# Patient Record
Sex: FEMALE | Employment: UNEMPLOYED
[De-identification: names, ages, dates, MRNs, and addresses within clinical notes are randomized per-mention and may not be internally consistent; named-entity substitution may affect disease eponyms.]

---

## 2017-08-05 ENCOUNTER — HEALTH MAINTENANCE LETTER (OUTPATIENT)
Age: 37
End: 2017-08-05

## 2018-07-31 ENCOUNTER — AMBULATORY - HEALTHEAST (OUTPATIENT)
Dept: INTERNAL MEDICINE | Facility: CLINIC | Age: 38
End: 2018-07-31

## 2019-05-22 ENCOUNTER — HOSPITAL ENCOUNTER (EMERGENCY)
Facility: CLINIC | Age: 39
Discharge: HOME | End: 2019-05-22
Attending: PSYCHIATRY & NEUROLOGY | Admitting: PSYCHIATRY & NEUROLOGY
Payer: COMMERCIAL

## 2019-05-22 VITALS
OXYGEN SATURATION: 99 % | DIASTOLIC BLOOD PRESSURE: 47 MMHG | SYSTOLIC BLOOD PRESSURE: 109 MMHG | TEMPERATURE: 98.5 F | HEART RATE: 69 BPM | RESPIRATION RATE: 16 BRPM

## 2019-05-22 DIAGNOSIS — G89.4 CHRONIC PAIN SYNDROME: ICD-10-CM

## 2019-05-22 DIAGNOSIS — F43.0 ACUTE REACTION TO STRESS: ICD-10-CM

## 2019-05-22 PROCEDURE — 99285 EMERGENCY DEPT VISIT HI MDM: CPT | Performed by: PSYCHIATRY & NEUROLOGY

## 2019-05-22 PROCEDURE — 99284 EMERGENCY DEPT VISIT MOD MDM: CPT | Mod: Z6 | Performed by: PSYCHIATRY & NEUROLOGY

## 2019-05-22 RX ORDER — BUPROPION HYDROCHLORIDE 300 MG/1
TABLET ORAL EVERY MORNING
COMMUNITY

## 2019-05-22 ASSESSMENT — ENCOUNTER SYMPTOMS
HYPERACTIVE: 0
ARTHRALGIAS: 1
ACTIVITY CHANGE: 1
MYALGIAS: 1
RESPIRATORY NEGATIVE: 1
GASTROINTESTINAL NEGATIVE: 1
DECREASED CONCENTRATION: 1
HEMATOLOGIC/LYMPHATIC NEGATIVE: 1
CARDIOVASCULAR NEGATIVE: 1
HALLUCINATIONS: 0
NERVOUS/ANXIOUS: 1
EYES NEGATIVE: 1

## 2019-05-22 NOTE — ED PROVIDER NOTES
I have performed an in person assessment of the patient. Based on this assessment the patient no longer requires a one on one attendant at this point in time.    Regla Hodges MD  1:48 PM  May 22, 2019           Regla Hodges MD  05/22/19 [id number]

## 2019-05-22 NOTE — DISCHARGE INSTRUCTIONS
Please follow the recommended treatment program with your primary care provider  Follow-up established care and services

## 2019-05-22 NOTE — ED AVS SNAPSHOT
[facility], [place], Emergency Department  [address]  [facility] [address]  Phone:  [phone]  Fax:  [phone]                                    Regla Lima   MRN: [id number]    Department:  [facility], Emergency Department   Date of Visit:  5/22/2019           After Visit Summary Signature Page    I have received my discharge instructions, and my questions have been answered. I have discussed any challenges I see with this plan with the nurse or doctor.    ..........................................................................................................................................  Patient/Patient Representative Signature      ..........................................................................................................................................  Patient Representative Print Name and Relationship to Patient    ..................................................               ................................................  Date                                   Time    ..........................................................................................................................................  Reviewed by Signature/Title    ...................................................              ..............................................  Date                                               Time          22EPIC Rev 08/18

## 2019-05-22 NOTE — ED NOTES
Bed: HW01  Expected date: 5/22/19  Expected time: 12:42 PM  Means of arrival: Ambulance  Comments:  [id number], [id number], crisis eval, yellow 5.

## 2019-05-22 NOTE — ED PROVIDER NOTES
History     Chief Complaint   Patient presents with     Suicidal     Pt was at clinic and made statements that could be inferred as self-harming. She has fibromyalgia and is in significant pain. She states that she will take any meds necessary for pain relief     The history is provided by the patient and medical records.     Regla Lima is a 38 year old female who has history of opiate dependence and is currently on methadone maintenance. Patient gets care through [facility]. She admits to struggling with heal pain lately. She felt overwhelmed given her multitude of medical conditions including fibromyalgia, chronic pain and being homeless. She has been using her narcotics inappropriately and needed hospitalized a week ago. [place] [facility] [place] and was given one dose of tylenol for her heel pain. She then had gone to the [facility] to see her doctor. She reports her doctor no longer will prescribe klonopin and ambien due to her recent ingestion. She got upset. She disclosed thoughts of going out to use. She alluded to suicidal thoughts. She felt more emotionally dysregulated when police arrived. She has been feeling better since arrival. She now denies thoughts of harming herself. She plans on agreeing to her doctor's recommended pain plan of taking Lyrica (which she finds helpful) but will be on a weekly basis. She will be getting a walker to help with ambulation. Patient feels safe going home. She will call her spouse to pick her up.    PERSONAL MEDICAL HISTORY  Past Medical History:   Diagnosis Date     Arthritis      Bone spur of right foot      Fibromyalgia      PAST SURGICAL HISTORY  Past Surgical History:   Procedure Laterality Date     CHOLECYSTECTOMY       ENT SURGERY      tonsillectomy     GASTRIC BYPASS       ORTHOPEDIC SURGERY      L knee     FAMILY HISTORY  Family History   Problem Relation Age of Onset     Arthritis Mother         Dad, Sister       Myocardial Infarction Mother 55     Hypertension Mother         Dad, sister, brothers      Cerebrovascular Disease Mother 40     Diabetes Mother         Father, brothers, sisters     Myocardial Infarction Father 50     Cerebrovascular Disease Father 70     Autoimmune Disease Brother         Wegeners      Cerebrovascular Disease Sister 35     SOCIAL HISTORY  Social History     Tobacco Use     Smoking status: Current Some Day Smoker     Types: Cigarettes     Smokeless tobacco: Current User   Substance Use Topics     Alcohol use: Yes     Comment: x2 in the last week     MEDICATIONS  No current facility-administered medications for this encounter.      Current Outpatient Medications   Medication     buPROPion (WELLBUTRIN XL) 300 MG 24 hr tablet     METHADONE HCL PO     pregabalin (LYRICA) 50 MG capsule     QUEtiapine (SEROQUEL) 50 MG tablet     venlafaxine (EFFEXOR) 37.5 MG tablet     ALLERGIES  Allergies   Allergen Reactions     Nsaids GI Disturbance     Tylenol [Acetaminophen] Other (See Comments)     Has liver enzymes elavate     Compazine [Prochlorperazine] Anxiety         I have reviewed the Medications, Allergies, Past Medical and Surgical History, and Social History in the Epic system.    Review of Systems   Constitutional: Positive for activity change.   HENT: Negative.    Eyes: Negative.    Respiratory: Negative.    Cardiovascular: Negative.    Gastrointestinal: Negative.    Genitourinary: Negative.    Musculoskeletal: Positive for arthralgias, gait problem and myalgias.   Skin: Negative.    Hematological: Negative.    Psychiatric/Behavioral: Positive for decreased concentration and suicidal ideas. Negative for hallucinations. The patient is nervous/anxious. The patient is not hyperactive.    All other systems reviewed and are negative.      Physical Exam   BP: 102/55  Pulse: 73  Temp: 96.9  F (36.1  C)  Resp: 16  SpO2: 99 %      Physical Exam   Constitutional: She appears well-developed and well-nourished.    HENT:   Head: Normocephalic.   Eyes: Pupils are equal, round, and reactive to light.   Neck: Normal range of motion.   Cardiovascular: Normal rate.   Pulmonary/Chest: Effort normal.   Abdominal: Soft.   Neurological: She is alert.   Skin: Skin is warm.   Psychiatric: She has a normal mood and affect. Her speech is normal and behavior is normal. Judgment and thought content normal. She is not agitated, not aggressive, not hyperactive, not actively hallucinating and not combative. Thought content is not paranoid and not delusional. Cognition and memory are normal. She expresses no homicidal and no suicidal ideation.   Nursing note and vitals reviewed.      ED Course        Procedures               Labs Ordered and Resulted from Time of ED Arrival Up to the Time of Departure from the ED - No data to display         Assessments & Plan (with Medical Decision Making)   Patient with chronic pain who got upset upon learning that she will not get klonopin and ambien. She now is able to process her treatment plan and agrees to it. She can be discharged. She is to follow-up established care and services.    I have reviewed the nursing notes.    I have reviewed the findings, diagnosis, plan and need for follow up with the patient.       Medication List      There are no discharge medications for this visit.         Final diagnoses:   Acute reaction to stress   Chronic pain syndrome       5/22/2019   [facility], [place], EMERGENCY DEPARTMENT     Bejnamin Willams MD  05/22/19 [id number]

## 2019-05-29 ENCOUNTER — TRANSFERRED RECORDS (OUTPATIENT)
Dept: HEALTH INFORMATION MANAGEMENT | Facility: CLINIC | Age: 39
End: 2019-05-29

## 2019-06-04 ENCOUNTER — TRANSFERRED RECORDS (OUTPATIENT)
Dept: HEALTH INFORMATION MANAGEMENT | Facility: CLINIC | Age: 39
End: 2019-06-04

## 2019-06-04 NOTE — TELEPHONE ENCOUNTER
ONCOLOGY INTAKE: Records Information      APPT INFORMATION:  Referring provider:  Zhanna Klein NP  Referring provider s clinic:  [facility]  Reason for visit/diagnosis:  Anemia  Has patient been notified of appointment date and time?: Yes    RECORDS INFORMATION:  Were the records received with the referral (via [employer])? No  Has patient been seen for any external appt for this diagnosis? Yes    If yes, where?[facility]      Has patient had any imaging or procedures outside of [place] for this condition? No      If Yes, where? n/a    ADDITIONAL INFORMATION:  [facility] is faxing records

## 2019-07-24 ENCOUNTER — PRE VISIT (OUTPATIENT)
Dept: ONCOLOGY | Facility: CLINIC | Age: 39
End: 2019-07-24

## 2019-10-04 NOTE — TELEPHONE ENCOUNTER
RECORDS RECEIVED FROM: Bilateral foot pain // per pt // Dr. Luiz Dill at [facility] referring // referral faxed to clinic   DATE RECEIVED: Nov 6, 2019    NOTES STATUS DETAILS   OFFICE NOTE from referring provider Care Everywhere Dr. Dill 10/1/19   OFFICE NOTE from other specialist N/A    DISCHARGE SUMMARY from hospital N/A    DISCHARGE REPORT from the ER N/A    OPERATIVE REPORT N/A    MEDICATION LIST Care Everywhere    IMPLANT RECORD/STICKER N/A    LABS     CBC/DIFF N/A    CULTURES N/A    INJECTIONS DONE IN RADIOLOGY N/A    MRI N/A    CT SCAN N/A    XRAYS (IMAGES & REPORTS) N/A    TUMOR     PATHOLOGY  Slides & report N/A

## 2019-11-04 ENCOUNTER — TELEPHONE (OUTPATIENT)
Dept: ORTHOPEDICS | Facility: CLINIC | Age: 39
End: 2019-11-04

## 2019-11-04 NOTE — TELEPHONE ENCOUNTER
Contacted pt to reschedule appt as provider is no longer working in this clinic. Gave pt clinic number to schedule in [place] or [facility]

## 2019-11-05 ENCOUNTER — TELEPHONE (OUTPATIENT)
Dept: ORTHOPEDICS | Facility: CLINIC | Age: 39
End: 2019-11-05

## 2019-11-05 NOTE — TELEPHONE ENCOUNTER
Called pt to reschedule appt as Selena Bauman no longer works in clinic. Left  with clinic number to reschedule.

## 2019-11-06 ENCOUNTER — PRE VISIT (OUTPATIENT)
Dept: ORTHOPEDICS | Facility: CLINIC | Age: 39
End: 2019-11-06

## 2021-05-26 ENCOUNTER — RECORDS - HEALTHEAST (OUTPATIENT)
Dept: ADMINISTRATIVE | Facility: CLINIC | Age: 41
End: 2021-05-26

## 2021-07-14 PROBLEM — F32.9 MDD (MAJOR DEPRESSIVE DISORDER): Status: RESOLVED | Noted: 2018-05-03 | Resolved: 2018-07-24
